# Patient Record
Sex: FEMALE | Race: WHITE | Employment: FULL TIME | URBAN - METROPOLITAN AREA
[De-identification: names, ages, dates, MRNs, and addresses within clinical notes are randomized per-mention and may not be internally consistent; named-entity substitution may affect disease eponyms.]

---

## 2017-04-11 ENCOUNTER — TRANSCRIBE ORDERS (OUTPATIENT)
Dept: ADMINISTRATIVE | Facility: HOSPITAL | Age: 67
End: 2017-04-11

## 2017-04-11 DIAGNOSIS — Z12.31 VISIT FOR SCREENING MAMMOGRAM: Primary | ICD-10-CM

## 2017-05-12 ENCOUNTER — HOSPITAL ENCOUNTER (OUTPATIENT)
Dept: MAMMOGRAPHY | Facility: MEDICAL CENTER | Age: 67
Discharge: HOME | End: 2017-05-12
Payer: COMMERCIAL

## 2017-05-12 DIAGNOSIS — Z12.31 VISIT FOR SCREENING MAMMOGRAM: ICD-10-CM

## 2017-05-12 PROCEDURE — G0202 SCR MAMMO BI INCL CAD: HCPCS

## 2018-08-09 ENCOUNTER — TRANSCRIBE ORDERS (OUTPATIENT)
Dept: ADMINISTRATIVE | Facility: HOSPITAL | Age: 68
End: 2018-08-09

## 2018-08-09 DIAGNOSIS — Z12.31 VISIT FOR SCREENING MAMMOGRAM: Primary | ICD-10-CM

## 2018-08-31 ENCOUNTER — HOSPITAL ENCOUNTER (OUTPATIENT)
Dept: MAMMOGRAPHY | Facility: MEDICAL CENTER | Age: 68
Discharge: HOME | End: 2018-08-31
Payer: COMMERCIAL

## 2018-08-31 DIAGNOSIS — Z12.31 VISIT FOR SCREENING MAMMOGRAM: ICD-10-CM

## 2018-08-31 PROCEDURE — 77067 SCR MAMMO BI INCL CAD: CPT

## 2020-09-24 ENCOUNTER — TRANSCRIBE ORDERS (OUTPATIENT)
Dept: ADMINISTRATIVE | Facility: HOSPITAL | Age: 70
End: 2020-09-24

## 2020-09-24 DIAGNOSIS — Z12.31 ENCOUNTER FOR SCREENING MAMMOGRAM FOR MALIGNANT NEOPLASM OF BREAST: Primary | ICD-10-CM

## 2021-01-08 ENCOUNTER — HOSPITAL ENCOUNTER (OUTPATIENT)
Dept: MAMMOGRAPHY | Facility: MEDICAL CENTER | Age: 71
Discharge: HOME | End: 2021-01-08
Payer: MEDICARE

## 2021-01-08 VITALS — HEIGHT: 65 IN | WEIGHT: 150 LBS | BODY MASS INDEX: 24.99 KG/M2

## 2021-01-08 DIAGNOSIS — Z12.31 ENCOUNTER FOR SCREENING MAMMOGRAM FOR MALIGNANT NEOPLASM OF BREAST: ICD-10-CM

## 2021-01-08 PROCEDURE — 77067 SCR MAMMO BI INCL CAD: CPT

## 2021-01-08 PROCEDURE — 77063 BREAST TOMOSYNTHESIS BI: CPT

## 2021-03-05 ENCOUNTER — EVALUATION (OUTPATIENT)
Dept: PHYSICAL THERAPY | Facility: CLINIC | Age: 71
End: 2021-03-05
Payer: MEDICARE

## 2021-03-05 DIAGNOSIS — M25.512 LEFT SHOULDER PAIN, UNSPECIFIED CHRONICITY: Primary | ICD-10-CM

## 2021-03-05 DIAGNOSIS — M25.511 RIGHT SHOULDER PAIN, UNSPECIFIED CHRONICITY: ICD-10-CM

## 2021-03-05 PROCEDURE — 97161 PT EVAL LOW COMPLEX 20 MIN: CPT | Performed by: PHYSICAL THERAPIST

## 2021-03-05 NOTE — LETTER
2021    Kendall Rutledge PA-C  [address] [place] [id number]    Patient: Shyann Mckeon   YOB: 1950   Date of Visit: 3/5/2021     Encounter Diagnosis     ICD-10-CM    1  Left shoulder pain, unspecified chronicity  M25 512    2  Right shoulder pain, unspecified chronicity  M25 511        Dear Dr Sarika Noyola:    Thank you for your recent referral of Shyann Mckeon  Please review the attached evaluation summary from Lynette's recent visit  Please verify that you agree with the plan of care by signing the attached order  If you have any questions or concerns, please do not hesitate to call  I sincerely appreciate the opportunity to share in the care of one of your patients and hope to have another opportunity to work with you in the near future  Sincerely,    Robinson Bell, PT      Referring Provider:      I certify that I have read the below Plan of Care and certify the need for these services furnished under this plan of treatment while under my care  Kendall Rutledge PA-C  [facility] [address] [id number]  Via Fax: [phone]          PT Evaluation     Today's date: 3/6/2021  Patient name: Shyann Mckeon  : 1950  MRN: [id number]  Referring provider: Meaghan Barr  Dx:   Encounter Diagnosis     ICD-10-CM    1  Left shoulder pain, unspecified chronicity  M25 512    2  Right shoulder pain, unspecified chronicity  M25 511                   Assessment  Assessment details: Shyann Mckeon is a 78y o  year old female presenting to PT with pain, decreased range of motion, decreased strength, and decreased tolerance to activity  Signs and symptoms are consistent with shoulder impingement with contributing forward head/shoulder posture    She has been seeing her chiropractor for neck stiffness, and was recommended to trial PT to address shoulder dysfunction The existing impairments result in difficulty with all overhead shoulder movements as well as repetitive lifting tasks  Melissa Arenas would benefit from skilled PT services to address these issues and to maximize function  Home exercise provided and all questions answered  Thank you for the referral     Impairments: abnormal or restricted ROM, impaired physical strength, lacks appropriate home exercise program, pain with function, scapular dyskinesis and poor posture   Understanding of Dx/Px/POC: good   Prognosis: good    Goals  STG 2-4 weeks  Increase UE strenth 1/2 grade MMT  Decrease pain to <5/10 with activity  Independent with self correction of posture    LTG 6-8 weeks  Demonstrate UE AROM WFL all planes  Decrease pain to <2/10 with activity  Patient independent with comprehensive HEP    Plan  Plan details: Limited to 1 visit/week due to patient's work schedule  Planned therapy interventions: joint mobilization, manual therapy, motor coordination training, neuromuscular re-education, postural training, therapeutic activities, therapeutic exercise, therapeutic training and transfer training  Frequency: 1x week  Duration in weeks: 6        Subjective Evaluation    History of Present Illness  Mechanism of injury: Melissa Arenas reports 3 months of bilateral shoulder pain  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[employer],   Primarily picking and packing orders  She notes that she sleeps on her side and notices increased pain in the left shoulder if she lays on it  She also notices limited rotation in all planes of the left shoulder  Symptoms are similar in the right shoulder but left severe  2-3 weeks ago she fell down 2-3 steps at work resulting in pain in her neck and shoulder, this is being treated by her chiropractor          Pain  Current pain ratin  At best pain rating: 3  At worst pain ratin  Quality: dull ache and sharp  Aggravating factors: lifting and overhead activity  Progression: no change    Treatments  Previous treatment: chiropractic  Current treatment: chiropractic and physical therapy  Patient Goals  Patient goals for therapy: decreased pain, increased motion, increased strength and return to sport/leisure activities          Objective     Tenderness     Left Shoulder   Tenderness in the coracoid process, lateral scapula and supraspinatus tendon  Active Range of Motion   Left Shoulder   Flexion: 140 degrees   Abduction: 120 degrees   External rotation BTH: C5   Internal rotation BTB: L4     Right Shoulder   Flexion: 160 degrees   Abduction: WFL  External rotation BTH: C5   Internal rotation BTB: T10     Strength/Myotome Testing     Left Shoulder     Planes of Motion   Flexion: 4-   Extension: 4-   External rotation at 0°: 4-     Right Shoulder     Planes of Motion   Flexion: 4   Extension: 4   External rotation at 0°: 4-     Tests     Left Shoulder   Positive drop arm, Hawkin's and painful arc  Negative empty can         Flowsheet Rows      Most Recent Value   PT/OT G-Codes   Current Score  59   Projected Score  69             Precautions: NA      Manuals 3/5            1 Rib, clavicle border, pec, serratus, subscap JL            SL scap mobs, ER/IR MWM JL                                      Neuro Re-Ed             UT, levator S bilaterally 4'            TB ER, low trap RTB :5x15                                                                             Ther Ex                                                                                                                     Ther Activity                                       Gait Training                                       Modalities

## 2021-03-05 NOTE — PROGRESS NOTES
PT Evaluation     Today's date: 3/6/2021  Patient name: Maryam Coates  : 1950  MRN: [id number]  Referring provider: Meaghan Martinez  Dx:   Encounter Diagnosis     ICD-10-CM    1  Left shoulder pain, unspecified chronicity  M25 512    2  Right shoulder pain, unspecified chronicity  M25 511                   Assessment  Assessment details: Maryam Coates is a 78y o  year old female presenting to PT with pain, decreased range of motion, decreased strength, and decreased tolerance to activity  Signs and symptoms are consistent with shoulder impingement with contributing forward head/shoulder posture  She has been seeing her chiropractor for neck stiffness, and was recommended to trial PT to address shoulder dysfunction The existing impairments result in difficulty with all overhead shoulder movements as well as repetitive lifting tasks  Agnieszka Hawthorne would benefit from skilled PT services to address these issues and to maximize function  Home exercise provided and all questions answered   Thank you for the referral     Impairments: abnormal or restricted ROM, impaired physical strength, lacks appropriate home exercise program, pain with function, scapular dyskinesis and poor posture   Understanding of Dx/Px/POC: good   Prognosis: good    Goals  STG 2-4 weeks  Increase UE strenth 1/2 grade MMT  Decrease pain to <5/10 with activity  Independent with self correction of posture    LTG 6-8 weeks  Demonstrate UE AROM WFL all planes  Decrease pain to <2/10 with activity  Patient independent with comprehensive HEP    Plan  Plan details: Limited to 1 visit/week due to patient's work schedule  Planned therapy interventions: joint mobilization, manual therapy, motor coordination training, neuromuscular re-education, postural training, therapeutic activities, therapeutic exercise, therapeutic training and transfer training  Frequency: 1x week  Duration in weeks: 6        Subjective Evaluation    History of Present Illness  Mechanism of injury: Genet Kapoor reports 3 months of bilateral shoulder pain  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[employer],   Primarily picking and packing orders  She notes that she sleeps on her side and notices increased pain in the left shoulder if she lays on it  She also notices limited rotation in all planes of the left shoulder  Symptoms are similar in the right shoulder but left severe  2-3 weeks ago she fell down 2-3 steps at work resulting in pain in her neck and shoulder, this is being treated by her chiropractor  Pain  Current pain ratin  At best pain rating: 3  At worst pain ratin  Quality: dull ache and sharp  Aggravating factors: lifting and overhead activity  Progression: no change    Treatments  Previous treatment: chiropractic  Current treatment: chiropractic and physical therapy  Patient Goals  Patient goals for therapy: decreased pain, increased motion, increased strength and return to sport/leisure activities          Objective     Tenderness     Left Shoulder   Tenderness in the coracoid process, lateral scapula and supraspinatus tendon  Active Range of Motion   Left Shoulder   Flexion: 140 degrees   Abduction: 120 degrees   External rotation BTH: C5   Internal rotation BTB: L4     Right Shoulder   Flexion: 160 degrees   Abduction: WFL  External rotation BTH: C5   Internal rotation BTB: T10     Strength/Myotome Testing     Left Shoulder     Planes of Motion   Flexion: 4-   Extension: 4-   External rotation at 0°: 4-     Right Shoulder     Planes of Motion   Flexion: 4   Extension: 4   External rotation at 0°: 4-     Tests     Left Shoulder   Positive drop arm, Hawkin's and painful arc  Negative empty can         Flowsheet Rows      Most Recent Value   PT/OT G-Codes   Current Score  59   Projected Score  69             Precautions: NA      Manuals 3/5            1 Rib, clavicle border, pec, serratus, subscap JL            SL scap mobs, ER/IR MWM JL Neuro Re-Ed             UT, levator S bilaterally 4'            TB ER, low trap RTB :5x15                                                                             Ther Ex                                                                                                                     Ther Activity                                       Gait Training                                       Modalities

## 2021-03-08 ENCOUNTER — TRANSCRIBE ORDERS (OUTPATIENT)
Dept: PHYSICAL THERAPY | Facility: CLINIC | Age: 71
End: 2021-03-08

## 2021-03-08 DIAGNOSIS — M25.511 RIGHT SHOULDER PAIN, UNSPECIFIED CHRONICITY: ICD-10-CM

## 2021-03-08 DIAGNOSIS — M25.512 LEFT SHOULDER PAIN, UNSPECIFIED CHRONICITY: Primary | ICD-10-CM

## 2021-03-12 ENCOUNTER — OFFICE VISIT (OUTPATIENT)
Dept: PHYSICAL THERAPY | Facility: CLINIC | Age: 71
End: 2021-03-12
Payer: MEDICARE

## 2021-03-12 DIAGNOSIS — M25.511 RIGHT SHOULDER PAIN, UNSPECIFIED CHRONICITY: ICD-10-CM

## 2021-03-12 DIAGNOSIS — M25.512 LEFT SHOULDER PAIN, UNSPECIFIED CHRONICITY: Primary | ICD-10-CM

## 2021-03-12 PROCEDURE — 97140 MANUAL THERAPY 1/> REGIONS: CPT

## 2021-03-12 PROCEDURE — 97112 NEUROMUSCULAR REEDUCATION: CPT

## 2021-03-12 NOTE — PROGRESS NOTES
Daily Note     Today's date: 3/12/2021  Patient name: Liya Garner  : 1950  MRN: [id number]  Referring provider: Meaghan Christensen  Dx:   Encounter Diagnosis     ICD-10-CM    1  Left shoulder pain, unspecified chronicity  M25 512    2  Right shoulder pain, unspecified chronicity  M25 511                   Subjective: Gustavo Martinez reports b/l shoulders feel stiff and achy upon arrival  She reports L is more bothersome than R  Sx's tend to get more aggravated following work  Objective: See treatment diary below      Assessment: Gustavo Martinez tolerated PT treatment well  Initiated session with MH to b/l shoulders  STM performed along b/l UT and rhomboid, significant tissue restriction and tightness present throughout  Added rhomboid stretch to address tightness, good tolerance  Shoulder PROM limited in all ranges L>R  Progressed scapular strengthening today, weakness evident accompanied with some L shoulder discomfort that resolved with rest  Pt would benefit from continued PT in effort to further improve shoulder ROM and maximize function with reduced pain/frequency of symptoms  Plan: Continue per plan of care        Precautions: NA      Manuals 3/5 3/12           1 Rib, clavicle border, pec, serratus, subscap JL PROM+STM JW           SL scap mobs, ER/IR MWM JL                                      Neuro Re-Ed             UT, levator S bilaterally 4' 30"x3 ea            TB ER, low trap RTB :5x15 RTB x15           Horizontal ABD  RTB x15           Row  RTB x15           Shoulder extension   RTB x15                                     Ther Ex                                                                                                                     Ther Activity                                       Gait Training                                       Modalities

## 2021-03-19 ENCOUNTER — OFFICE VISIT (OUTPATIENT)
Dept: PHYSICAL THERAPY | Facility: CLINIC | Age: 71
End: 2021-03-19
Payer: MEDICARE

## 2021-03-19 DIAGNOSIS — M25.512 LEFT SHOULDER PAIN, UNSPECIFIED CHRONICITY: Primary | ICD-10-CM

## 2021-03-19 PROCEDURE — 97112 NEUROMUSCULAR REEDUCATION: CPT | Performed by: PHYSICAL THERAPIST

## 2021-03-19 PROCEDURE — 97140 MANUAL THERAPY 1/> REGIONS: CPT | Performed by: PHYSICAL THERAPIST

## 2021-03-19 NOTE — PROGRESS NOTES
Daily Note     Today's date: 3/19/2021  Patient name: Ela Banks  : 1950  MRN: [id number]  Referring provider: Meaghan Huitron  Dx:   Encounter Diagnosis     ICD-10-CM    1  Left shoulder pain, unspecified chronicity  M25 512                   Subjective: Sara Salgado reports significant reduction in RUE symptoms  Her L shoulder will improve for several days, but then returns  Objective: See treatment diary below      Assessment: Tolerated treatment well and updated HEP to include wall clocks for scapular stabilization  Also education on self mobilization techniques for the levator, subscapularis and serratus anterior    Patient demonstrated fatigue post treatment, exhibited good technique with therapeutic exercises and would benefit from continued PT      Plan: Continue per plan of care  Progress treatment as tolerated         Precautions: NA      Manuals 3/5 3/12 3/19          1 Rib, clavicle border, pec, serratus, subscap JL PROM+STM JW JL          SL scap mobs, ER/IR MWM JL  JL          Thoracic HVLA   JL                       Neuro Re-Ed             UT, levator S bilaterally 4' 30"x3 ea            TB ER, low trap RTB :5x15 RTB x15           Horizontal ABD  RTB x15           Row  RTB x15           Shoulder extension   RTB x15           Wall clocks   RTB 5x          Levator, subscap, Serratus MWM   5'          Ther Ex                                                                                                                     Ther Activity                                       Gait Training                                       Modalities

## 2021-03-26 ENCOUNTER — OFFICE VISIT (OUTPATIENT)
Dept: PHYSICAL THERAPY | Facility: CLINIC | Age: 71
End: 2021-03-26
Payer: MEDICARE

## 2021-03-26 DIAGNOSIS — M25.511 RIGHT SHOULDER PAIN, UNSPECIFIED CHRONICITY: ICD-10-CM

## 2021-03-26 DIAGNOSIS — M25.512 LEFT SHOULDER PAIN, UNSPECIFIED CHRONICITY: Primary | ICD-10-CM

## 2021-03-26 PROCEDURE — 97112 NEUROMUSCULAR REEDUCATION: CPT | Performed by: PHYSICAL THERAPIST

## 2021-03-26 PROCEDURE — 97140 MANUAL THERAPY 1/> REGIONS: CPT | Performed by: PHYSICAL THERAPIST

## 2021-03-26 NOTE — PROGRESS NOTES
Daily Note     Today's date: 3/26/2021  Patient name: Silvana Briones  : 1950  MRN: [id number]  Referring provider: Meaghan Olivas  Dx:   Encounter Diagnosis     ICD-10-CM    1  Left shoulder pain, unspecified chronicity  M25 512    2  Right shoulder pain, unspecified chronicity  M25 511                   Subjective: Payton Falcon reports that overall her shoulders continue to improve  Right is nearly pain free while the left comes and goes - but never as severe as prior to PT  Objective: See treatment diary below      Assessment: Tolerated treatment well and continues to progress well towards all goals  Strength and ROM continue to improve, particularly on the left    Patient demonstrated fatigue post treatment and would benefit from continued PT      Plan: Continue per plan of care  Progress treatment as tolerated         Precautions: NA      Manuals 3/5 3/12 3/19 3/26         1 Rib, clavicle border, pec, serratus, subscap JL PROM+STM JW JL JL         SL scap mobs, ER/IR MWM JL  JL JL         Thoracic HVLA   JL                       Neuro Re-Ed             UT, levator S bilaterally 4' 30"x3 ea            TB ER, low trap RTB :5x15 RTB x15  RTB x15         Horizontal ABD  RTB x15 RTB x15 RTB 15x         Row  RTB x15  BTB x15         Shoulder extension   RTB x15  BTB 15x         Wall clocks   RTB 5x RTB 5x ea         Levator, subscap, Serratus MWM   5'          Ther Ex                                                                                                                     Ther Activity                                       Gait Training                                       Modalities

## 2021-04-02 ENCOUNTER — OFFICE VISIT (OUTPATIENT)
Dept: PHYSICAL THERAPY | Facility: CLINIC | Age: 71
End: 2021-04-02
Payer: MEDICARE

## 2021-04-02 DIAGNOSIS — M25.511 RIGHT SHOULDER PAIN, UNSPECIFIED CHRONICITY: ICD-10-CM

## 2021-04-02 DIAGNOSIS — M25.512 LEFT SHOULDER PAIN, UNSPECIFIED CHRONICITY: Primary | ICD-10-CM

## 2021-04-02 PROCEDURE — 97140 MANUAL THERAPY 1/> REGIONS: CPT | Performed by: PHYSICAL THERAPIST

## 2021-04-02 PROCEDURE — 97112 NEUROMUSCULAR REEDUCATION: CPT | Performed by: PHYSICAL THERAPIST

## 2021-04-02 NOTE — PROGRESS NOTES
Daily Note     Today's date: 2021  Patient name: Antonino Bradford  : 1950  MRN: [id number]  Referring provider: Meaghan Briggs  Dx:   Encounter Diagnosis     ICD-10-CM    1  Left shoulder pain, unspecified chronicity  M25 512    2  Right shoulder pain, unspecified chronicity  M25 511                   Subjective: Jenn Simon has determined that her 3-4 digits on left hand experience paresthesias most consistently  Her left shoulder was aggravated when lifting at work this week  She also notices increased pain when sleeping on her side  Objective: See treatment diary below      Assessment: Tolerated treatment well and reviewed sleep positioning to decrease pain in her left shoulder    Patient demonstrated fatigue post treatment and would benefit from continued PT      Plan: Continue per plan of care  Progress treatment as tolerated         Precautions: NA      Manuals 3/5 3/12 3/19 3/26 4/2        1 Rib, clavicle border, pec, serratus, subscap JL PROM+STM JW JL JL JL        SL scap mobs, ER/IR MWM JL  JL JL JL        Thoracic HVLA   JL                       Neuro Re-Ed             UT, levator S bilaterally 4' 30"x3 ea            TB ER, low trap RTB :5x15 RTB x15  RTB x15         Horizontal ABD  RTB x15 RTB x15 RTB 15x         Row  RTB x15  BTB x15         Shoulder extension   RTB x15  BTB 15x         Wall clocks   RTB 5x RTB 5x ea         Levator, subscap, Serratus MWM   5'          Education      sleeping posture, HEP 15'        Ther Ex                                                                                                                     Ther Activity                                       Gait Training                                       Modalities

## 2021-04-09 ENCOUNTER — OFFICE VISIT (OUTPATIENT)
Dept: PHYSICAL THERAPY | Facility: CLINIC | Age: 71
End: 2021-04-09
Payer: MEDICARE

## 2021-04-09 DIAGNOSIS — M25.512 LEFT SHOULDER PAIN, UNSPECIFIED CHRONICITY: Primary | ICD-10-CM

## 2021-04-09 PROCEDURE — 97140 MANUAL THERAPY 1/> REGIONS: CPT

## 2021-04-09 PROCEDURE — 97110 THERAPEUTIC EXERCISES: CPT

## 2021-04-09 NOTE — PROGRESS NOTES
Daily Note     Today's date: 2021  Patient name: Poornima Bell  : 1950  MRN: [id number]  Referring provider: Meaghan Villalta  Dx:   Encounter Diagnosis     ICD-10-CM    1  Left shoulder pain, unspecified chronicity  M25 512        Start Time: 1032  Stop Time: 1117  Total time in clinic (min): 45 minutes    Subjective: Pt continues to note B shoulder pain (L>R)  She experiences relief following PT sessions and with stretches at home but pain/sx return within 1-5 hours following treatment  2-3/10 pain at start of session  Objective: See treatment diary below      Assessment: Tolerated treatment well  Able to modulate pain with MHP and STM at start of session  Pt reports greatest muscle fatigue with "wall clock" exercise  Patient demonstrated fatigue post treatment, exhibited good technique with therapeutic exercises and would benefit from continued PT      Plan: Continue per plan of care        Precautions: NA      Manuals 3/5 3/12 3/19 3/26 4/2 4/9       1 Rib, clavicle border, pec, serratus, subscap JL PROM+STM JW JL JL JL VR       SL scap mobs, ER/IR MWM JL  JL JL JL VR       Thoracic HVLA   JL                       Neuro Re-Ed             UT, levator S bilaterally 4' 30"x3 ea     home       TB ER, low trap RTB :5x15 RTB x15  RTB x15  RTB  x15       Horizontal ABD  RTB x15 RTB x15 RTB 15x  RTB  x15       Row  RTB x15  BTB x15  BTB  x20       Shoulder extension   RTB x15  BTB 15x  BTB  x15       Wall clocks   RTB 5x RTB 5x ea         Levator, subscap, Serratus MWM   5'          Education      sleeping posture, HEP 15' reviewed sleeping posture and HEP        Ther Ex                                                                                                                     Ther Activity                                       Gait Training                                       Modalities

## 2021-04-16 ENCOUNTER — OFFICE VISIT (OUTPATIENT)
Dept: PHYSICAL THERAPY | Facility: CLINIC | Age: 71
End: 2021-04-16
Payer: MEDICARE

## 2021-04-16 DIAGNOSIS — M25.512 LEFT SHOULDER PAIN, UNSPECIFIED CHRONICITY: Primary | ICD-10-CM

## 2021-04-16 PROCEDURE — 97140 MANUAL THERAPY 1/> REGIONS: CPT | Performed by: PHYSICAL THERAPIST

## 2021-04-16 NOTE — PROGRESS NOTES
Daily Note     Today's date: 2021  Patient name: Joleen Friday  : 1950  MRN: [id number]  Referring provider: Meaghan Mckeon  Dx:   Encounter Diagnosis     ICD-10-CM    1  Left shoulder pain, unspecified chronicity  M25 512                   Subjective: Derik Cons reports continues soreness and limited IR rotation behind her back  Objective: See treatment diary below      Assessment: Tolerated treatment well and shows increased active mobility following manual therapy today, she also has decreased pain at end range  MT included T1 shear, sub scap and medial scap border MWM   Patient would benefit from continued PT      Plan: Continue per plan of care  Progress treatment as tolerated         Precautions: NA      Manuals 3/5 3/12 3/19 3/26 4/2 4/9 4/16      1 Rib, clavicle border, pec, serratus, subscap JL PROM+STM JW JL JL JL VR JL      SL scap mobs, ER/IR MWM JL  JL JL JL VR JL      Thoracic HVLA   JL          T1 shear MWM       JL      Neuro Re-Ed             UT, levator S bilaterally 4' 30"x3 ea     home       TB ER, low trap RTB :5x15 RTB x15  RTB x15  RTB  x15 RTB x15      Horizontal ABD  RTB x15 RTB x15 RTB 15x  RTB  x15       Row  RTB x15  BTB x15  BTB  x20       Shoulder extension   RTB x15  BTB 15x  BTB  x15       Wall clocks   RTB 5x RTB 5x ea         Levator, subscap, Serratus MWM   5'          Education      sleeping posture, HEP 15' reviewed sleeping posture and HEP        Ther Ex                                                                                                                     Ther Activity                                       Gait Training                                       Modalities

## 2021-04-23 ENCOUNTER — OFFICE VISIT (OUTPATIENT)
Dept: PHYSICAL THERAPY | Facility: CLINIC | Age: 71
End: 2021-04-23
Payer: MEDICARE

## 2021-04-23 DIAGNOSIS — M25.512 LEFT SHOULDER PAIN, UNSPECIFIED CHRONICITY: Primary | ICD-10-CM

## 2021-04-23 DIAGNOSIS — M25.511 RIGHT SHOULDER PAIN, UNSPECIFIED CHRONICITY: ICD-10-CM

## 2021-04-23 PROCEDURE — 97140 MANUAL THERAPY 1/> REGIONS: CPT | Performed by: PHYSICAL THERAPIST

## 2021-04-23 NOTE — PROGRESS NOTES
Daily Note     Today's date: 2021  Patient name: Dayanna Garcia  : 1950  MRN: [id number]  Referring provider: Meaghan Schmid  Dx:   Encounter Diagnosis     ICD-10-CM    1  Left shoulder pain, unspecified chronicity  M25 512    2  Right shoulder pain, unspecified chronicity  M25 511                   Subjective: Shanon Anne notices improving ROM out to the side and behind her back  She had much less pain for about 3 days after last visit  Objective: See treatment diary below      Assessment: Tolerated treatment well and continues to benefit from manual therapy to compliment HEP  Reviewed sleeper stretch to continue to address IR deficits at home    Patient exhibited good technique with therapeutic exercises and would benefit from continued PT      Plan: Continue per plan of care  Progress treatment as tolerated         Precautions: NA      Manuals 3/5 3/12 3/19 3/26 4/2 4/9 4/16 4/23     1 Rib, clavicle border, pec, serratus, subscap JL PROM+STM JW JL JL JL VR JL JL     SL scap mobs, ER/IR MWM JL  JL JL JL VR JL JL     Thoracic HVLA   JL          T1 shear MWM       JL JL     Neuro Re-Ed             UT, levator S bilaterally 4' 30"x3 ea     home       TB ER, low trap RTB :5x15 RTB x15  RTB x15  RTB  x15 RTB x15      Horizontal ABD  RTB x15 RTB x15 RTB 15x  RTB  x15       Row  RTB x15  BTB x15  BTB  x20       Shoulder extension   RTB x15  BTB 15x  BTB  x15       Wall clocks   RTB 5x RTB 5x ea         Levator, subscap, Serratus MWM   5'          Education      sleeping posture, HEP 15' reviewed sleeping posture and HEP   HEP reivew     Ther Ex                                                                                                                     Ther Activity                                       Gait Training                                       Modalities

## 2021-04-30 ENCOUNTER — OFFICE VISIT (OUTPATIENT)
Dept: PHYSICAL THERAPY | Facility: CLINIC | Age: 71
End: 2021-04-30
Payer: MEDICARE

## 2021-04-30 DIAGNOSIS — M25.512 LEFT SHOULDER PAIN, UNSPECIFIED CHRONICITY: Primary | ICD-10-CM

## 2021-04-30 DIAGNOSIS — M25.511 RIGHT SHOULDER PAIN, UNSPECIFIED CHRONICITY: ICD-10-CM

## 2021-04-30 PROCEDURE — 97140 MANUAL THERAPY 1/> REGIONS: CPT | Performed by: PHYSICAL THERAPIST

## 2021-04-30 NOTE — PROGRESS NOTES
Daily Note     Today's date: 2021  Patient name: Dayanna Garcia  : 1950  MRN: [id number]  Referring provider: Meaghan Schmid  Dx:   Encounter Diagnosis     ICD-10-CM    1  Left shoulder pain, unspecified chronicity  M25 512    2  Right shoulder pain, unspecified chronicity  M25 511                   Subjective: Shanon Anne notes improvement in overall shoulder mobility  Objective: See treatment diary below      Assessment: Tolerated treatment well and continued focus on MT today  Included nerve tensioning to LUE today with some relief of deep ache in her shoulder    Patient exhibited good technique with therapeutic exercises and would benefit from continued PT      Plan: Continue per plan of care  Progress treatment as tolerated         Precautions: NA      Manuals 3/5 3/12 3/19 3/26 4/2 4/9 4/16 4/23 4/30    1 Rib, clavicle border, pec, serratus, subscap JL PROM+STM JW JL JL JL VR JL JL JL    SL scap mobs, ER/IR MWM JL  JL JL JL VR JL JL JL    Thoracic HVLA   JL          T1 shear MWM       JL JL JL    LUE nerve tensioning, median bias         JL    Neuro Re-Ed             UT, levator S bilaterally 4' 30"x3 ea     home       TB ER, low trap RTB :5x15 RTB x15  RTB x15  RTB  x15 RTB x15      Horizontal ABD  RTB x15 RTB x15 RTB 15x  RTB  x15       Row  RTB x15  BTB x15  BTB  x20       Shoulder extension   RTB x15  BTB 15x  BTB  x15       Wall clocks   RTB 5x RTB 5x ea         Levator, subscap, Serratus MWM   5'          Education      sleeping posture, HEP 15' reviewed sleeping posture and HEP   HEP reivew     Ther Ex                                                                                                                     Ther Activity                                       Gait Training                                       Modalities

## 2021-05-07 ENCOUNTER — OFFICE VISIT (OUTPATIENT)
Dept: PHYSICAL THERAPY | Facility: CLINIC | Age: 71
End: 2021-05-07
Payer: MEDICARE

## 2021-05-07 DIAGNOSIS — M25.511 RIGHT SHOULDER PAIN, UNSPECIFIED CHRONICITY: ICD-10-CM

## 2021-05-07 DIAGNOSIS — M25.512 LEFT SHOULDER PAIN, UNSPECIFIED CHRONICITY: Primary | ICD-10-CM

## 2021-05-07 PROCEDURE — 97110 THERAPEUTIC EXERCISES: CPT | Performed by: PHYSICAL THERAPIST

## 2021-05-07 PROCEDURE — 97140 MANUAL THERAPY 1/> REGIONS: CPT | Performed by: PHYSICAL THERAPIST

## 2021-05-07 NOTE — PROGRESS NOTES
Daily Note     Today's date: 2021  Patient name: Stacey Villalobos  : 1950  MRN: [id number]  Referring provider: Meaghan Boo  Dx:   Encounter Diagnosis     ICD-10-CM    1  Left shoulder pain, unspecified chronicity  M25 512    2  Right shoulder pain, unspecified chronicity  M25 511                   Subjective: Pt reports that she continues with some shoulder irritation with reaching behind her back and overhead  She feels the manual work has been helping  Objective: See treatment diary below  L cervical SB and rotation restricted and painful--> improved post mobilizations  L shoulder flexion pain limited at 140*--> similar post manuals but less pain      Assessment: Tolerated treatment well  Patient would benefit from continued PT  Pt with improvements in overhead lifting during cervical retraction and after repeated retractions  Educated on following up manuals with this stretching for home  Plan: Continue per plan of care        Precautions: NA      Manuals 3/5 3/12 3/19 3/26 4/2 4/9 4/16 4/23 4/30 5/7   1 Rib, clavicle border, pec, serratus, subscap JL PROM+STM JW JL JL JL VR JL JL JL CB   SL scap mobs, ER/IR MWM JL  JL JL JL VR JL JL JL CB   Thoracic HVLA   JL          T1 shear MWM       JL JL JL CB   LUE nerve tensioning, median bias         JL    Neuro Re-Ed             UT, levator S bilaterally 4' 30"x3 ea     home       TB ER, low trap RTB :5x15 RTB x15  RTB x15  RTB  x15 RTB x15      Horizontal ABD  RTB x15 RTB x15 RTB 15x  RTB  x15       Row  RTB x15  BTB x15  BTB  x20       Shoulder extension   RTB x15  BTB 15x  BTB  x15       Wall clocks   RTB 5x RTB 5x ea         Levator, subscap, Serratus MWM   5'          Repeated retractio             Education      sleeping posture, HEP 15' reviewed sleeping posture and HEP   HEP reivew     Ther Ex                                                                                                                     Ther Activity Gait Training                                       Modalities

## 2021-05-14 ENCOUNTER — OFFICE VISIT (OUTPATIENT)
Dept: PHYSICAL THERAPY | Facility: CLINIC | Age: 71
End: 2021-05-14
Payer: MEDICARE

## 2021-05-14 DIAGNOSIS — M25.511 RIGHT SHOULDER PAIN, UNSPECIFIED CHRONICITY: ICD-10-CM

## 2021-05-14 DIAGNOSIS — M25.512 LEFT SHOULDER PAIN, UNSPECIFIED CHRONICITY: Primary | ICD-10-CM

## 2021-05-14 PROCEDURE — 97110 THERAPEUTIC EXERCISES: CPT | Performed by: PHYSICAL THERAPIST

## 2021-05-14 PROCEDURE — 97140 MANUAL THERAPY 1/> REGIONS: CPT | Performed by: PHYSICAL THERAPIST

## 2021-05-21 ENCOUNTER — OFFICE VISIT (OUTPATIENT)
Dept: PHYSICAL THERAPY | Facility: CLINIC | Age: 71
End: 2021-05-21
Payer: MEDICARE

## 2021-05-21 DIAGNOSIS — M25.511 RIGHT SHOULDER PAIN, UNSPECIFIED CHRONICITY: ICD-10-CM

## 2021-05-21 DIAGNOSIS — M25.512 LEFT SHOULDER PAIN, UNSPECIFIED CHRONICITY: Primary | ICD-10-CM

## 2021-05-21 PROCEDURE — 97140 MANUAL THERAPY 1/> REGIONS: CPT | Performed by: PHYSICAL THERAPIST

## 2021-05-21 NOTE — PROGRESS NOTES
Daily Note     Today's date: 2021  Patient name: Refugio Tate  : 1950  MRN: [id number]  Referring provider: Meaghan Max  Dx:   Encounter Diagnosis     ICD-10-CM    1  Left shoulder pain, unspecified chronicity  M25 512    2  Right shoulder pain, unspecified chronicity  M25 511                   Subjective: Elvis Kim notes continued improvement in her left shoulder AROM and pain levels  Objective: See treatment diary below      Assessment: Tolerated treatment well and demonstrates increased abduction AROM follow AC mobs and scapular border release  Patient exhibited good technique with therapeutic exercises and would benefit from continued PT      Plan: Continue per plan of care  Progress treatment as tolerated  Precautions: NA      Manuals    1 Rib, clavicle border, pec, serratus, subscap CB  15     JL JL JL CB   SL scap mobs, ER/IR MWM CB 15     JL JL JL CB   Thoracic HVLA             T1 shear MWM CB 5     JL JL JL CB   LUE nerve tensioning, median bias  5       JL    Neuro Re-Ed             UT, levator S bilaterally             TB ER, low trap       RTB x15      Horizontal ABD             Row             Shoulder extension              Wall clocks             Levator, subscap, Serratus MWM             Repeated retractio             Education         HEP reivew     Ther Ex             Table slides flexion 15x10:                                                                                                        Ther Activity                                       Gait Training                                       Modalities

## 2021-05-28 ENCOUNTER — OFFICE VISIT (OUTPATIENT)
Dept: PHYSICAL THERAPY | Facility: CLINIC | Age: 71
End: 2021-05-28
Payer: MEDICARE

## 2021-05-28 DIAGNOSIS — M25.512 LEFT SHOULDER PAIN, UNSPECIFIED CHRONICITY: Primary | ICD-10-CM

## 2021-05-28 PROCEDURE — 97140 MANUAL THERAPY 1/> REGIONS: CPT | Performed by: PHYSICAL THERAPIST

## 2021-05-28 NOTE — PROGRESS NOTES
Daily Note     Today's date: 2021  Patient name: Pam Banegas  : 1950  MRN: [id number]  Referring provider: Meaghan Moran  Dx:   Encounter Diagnosis     ICD-10-CM    1  Left shoulder pain, unspecified chronicity  M25 512                   Subjective: Ratna Charles notes that the left shoulder is more sore today than it has been  She is not ready to seek intervention beyond PT  Objective: See treatment diary below      Assessment: Tolerated treatment fair and Continued focus on MT with decrease in pain and increased mobility by end of sesion  Educated patient on potential benefits of injection therapy if her MD were to find this an appropriate addition to her current conservative care    Patient would benefit from continued PT      Plan: Continue per plan of care  Progress treatment as tolerated  Precautions: NA      Manuals  5   1 Rib, clavicle border, pec, serratus, subscap CB  15 15    JL JL JL CB   SL scap mobs, ER/IR MWM CB 15 15      JL JL JL CB   Thoracic HVLA             T1 shear MWM CB 5 5    JL JL JL CB   LUE nerve tensioning, median bias  5 5      JL    Neuro Re-Ed             UT, levator S bilaterally             TB ER, low trap       RTB x15      Horizontal ABD             Row             Shoulder extension              Wall clocks             Levator, subscap, Serratus MWM             Repeated retractio             Education         HEP reivew     Ther Ex             Table slides flexion 15x10:                                                                                                        Ther Activity                                       Gait Training                                       Modalities

## 2021-06-04 ENCOUNTER — OFFICE VISIT (OUTPATIENT)
Dept: PHYSICAL THERAPY | Facility: CLINIC | Age: 71
End: 2021-06-04
Payer: MEDICARE

## 2021-06-04 DIAGNOSIS — M25.512 LEFT SHOULDER PAIN, UNSPECIFIED CHRONICITY: Primary | ICD-10-CM

## 2021-06-04 PROCEDURE — 97140 MANUAL THERAPY 1/> REGIONS: CPT | Performed by: PHYSICAL THERAPIST

## 2021-06-04 NOTE — PROGRESS NOTES
Daily Note     Today's date: 2021  Patient name: Poornima Bell  : 1950  MRN: [id number]  Referring provider: Meaghan Villalta  Dx:   Encounter Diagnosis     ICD-10-CM    1  Left shoulder pain, unspecified chronicity  M25 512                   Subjective: Sandhya Munoz is having higher grade pain, consistent with last week  She is still hesitant to pursue injection or additional imaging  Objective: See treatment diary below      Assessment: Tolerated treatment fair and has improved mobility but minimal reduction in pain today    Patient would benefit from continued PT      Plan: Continue per plan of care  Precautions: NA      Manuals  5   1 Rib, clavicle border, pec, serratus, subscap CB  15 15 15   JL JL JL CB   SL scap mobs, ER/IR MWM CB 15 15   15   JL JL JL CB   Thoracic HVLA    5         T1 shear MWM CB 5 5 5   JL JL JL CB   LUE nerve tensioning, median bias  5 5      JL    Neuro Re-Ed             UT, levator S bilaterally             TB ER, low trap       RTB x15      Horizontal ABD             Row             Shoulder extension              Wall clocks             Levator, subscap, Serratus MWM             Repeated retractio             Education         HEP reivew     Ther Ex             Table slides flexion 15x10:                                                                                                        Ther Activity                                       Gait Training                                       Modalities

## 2021-06-11 ENCOUNTER — OFFICE VISIT (OUTPATIENT)
Dept: PHYSICAL THERAPY | Facility: CLINIC | Age: 71
End: 2021-06-11
Payer: MEDICARE

## 2021-06-11 DIAGNOSIS — M25.512 LEFT SHOULDER PAIN, UNSPECIFIED CHRONICITY: Primary | ICD-10-CM

## 2021-06-11 PROCEDURE — 97112 NEUROMUSCULAR REEDUCATION: CPT | Performed by: PHYSICAL THERAPIST

## 2021-06-11 PROCEDURE — 97140 MANUAL THERAPY 1/> REGIONS: CPT | Performed by: PHYSICAL THERAPIST

## 2021-06-11 NOTE — PROGRESS NOTES
Daily Note     Today's date: 2021  Patient name: Poornima Bell  : 1950  MRN: [id number]  Referring provider: Meaghan Villalta  Dx:   Encounter Diagnosis     ICD-10-CM    1  Left shoulder pain, unspecified chronicity  M25 512                   Subjective: Sandhya Munoz reports her left shoulder pain is intermittent, sometimes lateral and sometimes posterior  Objective: See treatment diary below      Assessment: Tolerated treatment well and she notes decreased pain and increased ROM by end of session today  Improved IR following medial scap STM and scap mobility  HEP updated to include D2 PNF after responding to MRE today  Patient demonstrated fatigue post treatment, exhibited good technique with therapeutic exercises and would benefit from continued PT      Plan: Continue per plan of care  Progress treatment as tolerated  Precautions: NA      Manuals  5   1 Rib, clavicle border, pec, serratus, subscap CB  15 15 15 15  JL JL JL CB   SL scap mobs, ER/IR MWM CB 15 15   15 15  JL JL JL CB   Thoracic HVLA    5         T1 shear MWM CB 5 5 5   JL JL JL CB   LUE nerve tensioning, median bias  5 5      JL    Neuro Re-Ed             HEP review      10'       PNF D2      15x       TB ER, low trap       RTB x15      Horizontal ABD             Row             Shoulder extension              Wall clocks             Levator, subscap, Serratus MWM             Repeated retractio             Education         HEP reivew     Ther Ex             Table slides flexion 15x10:                                                                                                        Ther Activity                                       Gait Training                                       Modalities

## 2021-06-18 ENCOUNTER — OFFICE VISIT (OUTPATIENT)
Dept: PHYSICAL THERAPY | Facility: CLINIC | Age: 71
End: 2021-06-18
Payer: MEDICARE

## 2021-06-18 DIAGNOSIS — M25.512 LEFT SHOULDER PAIN, UNSPECIFIED CHRONICITY: Primary | ICD-10-CM

## 2021-06-18 DIAGNOSIS — M25.511 RIGHT SHOULDER PAIN, UNSPECIFIED CHRONICITY: ICD-10-CM

## 2021-06-18 PROCEDURE — 97112 NEUROMUSCULAR REEDUCATION: CPT | Performed by: PHYSICAL THERAPIST

## 2021-06-18 PROCEDURE — 97140 MANUAL THERAPY 1/> REGIONS: CPT | Performed by: PHYSICAL THERAPIST

## 2021-06-18 NOTE — PROGRESS NOTES
PT Discharge    Today's date: 2021  Patient name: Refugio Tate  : 1950  MRN: [id number]  Referring provider: Meaghan Max  Dx:   Encounter Diagnosis     ICD-10-CM    1  Left shoulder pain, unspecified chronicity  M25 512    2  Right shoulder pain, unspecified chronicity  M25 511                   Assessment  Assessment details: Elvis Kim has been compliant with attending PT and home exercise program since initial eval and reports >50% improvement since start of care  Elvis Kim reports and demonstrates decreased pain, increased strength, improved joint mobility and improved postural awareness since initial eval but is still limited compared to prior level of function  Elvis Kim continues to have soreness in the left shoulder with prolonged exertion  She is currently restricted from additional overtime by her PCP and would benefit from continued limitations  Impairments: abnormal or restricted ROM, impaired physical strength, lacks appropriate home exercise program, pain with function, scapular dyskinesis and poor posture   Understanding of Dx/Px/POC: good   Prognosis: good    Goals  STG 2-4 weeks  Increase UE strenth 1/2 grade MMT - met  Decrease pain to <5/10 with activity - met  Independent with self correction of posture - met    LTG 6-8 weeks  Demonstrate UE AROM WFL all planes - progressing  Decrease pain to <2/10 with activity - progressing  Patient independent with comprehensive HEP - met    Plan  Planned therapy interventions: joint mobilization, manual therapy, motor coordination training, neuromuscular re-education, postural training, therapeutic activities, therapeutic exercise, therapeutic training and transfer training        Subjective Evaluation    History of Present Illness  Mechanism of injury: Elvis Kim reports 3 months of bilateral shoulder pain  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[employer],   Primarily picking and packing orders       She notes that she sleeps on her side and notices increased pain in the left shoulder if she lays on it  She also notices limited rotation in all planes of the left shoulder  Symptoms are similar in the right shoulder but left severe  2-3 weeks ago she fell down 2-3 steps at work resulting in pain in her neck and shoulder, this is being treated by her chiropractor  Pain  Current pain ratin  At best pain rating: 3  At worst pain ratin  Quality: dull ache and sharp  Aggravating factors: lifting and overhead activity  Progression: no change    Treatments  Previous treatment: chiropractic  Current treatment: chiropractic and physical therapy  Patient Goals  Patient goals for therapy: decreased pain, increased motion, increased strength and return to sport/leisure activities          Objective     Tenderness     Left Shoulder   Tenderness in the coracoid process, lateral scapula and supraspinatus tendon  Active Range of Motion   Left Shoulder   Flexion: 140 degrees   Abduction: 120 degrees   External rotation BTH: C5   Internal rotation BTB: L4     Right Shoulder   Flexion: 160 degrees   Abduction: WFL  External rotation BTH: C5   Internal rotation BTB: T10     Strength/Myotome Testing     Left Shoulder     Planes of Motion   Flexion: 4-   Extension: 4-   External rotation at 0°: 4-     Right Shoulder     Planes of Motion   Flexion: 4   Extension: 4   External rotation at 0°: 4-     Tests     Left Shoulder   Positive drop arm, Hawkin's and painful arc  Negative empty can                Precautions: NA      Manuals  6       1 Rib, clavicle border, pec, serratus, subscap CB  15 15 15 15 15       SL scap mobs, ER/IR MWM CB 15 15   15 15 15         Thoracic HVLA    5         T1 shear MWM CB 5 5 5         LUE nerve tensioning, median bias  5 5          Neuro Re-Ed             HEP review      JL       PNF D2             TB ER, low trap             Horizontal ABD             Row             Shoulder extension Wall clocks             Levator, subscap, Serratus MWM             Repeated retractio             Education              Ther Ex             Table slides flexion 15x10:                                                                                                        Ther Activity                                       Gait Training                                       Modalities

## 2021-06-18 NOTE — LETTER
2021    Alexis Keita PA-C  [address] [place] [id number]    Patient: Antonino Bradford   YOB: 1950   Date of Visit: 2021     Encounter Diagnosis     ICD-10-CM    1  Left shoulder pain, unspecified chronicity  M25 512    2  Right shoulder pain, unspecified chronicity  M25 511        Dear Dr Carl Larry: Thank you for your recent referral of Antonino Bradford  Please review the attached evaluation summary from Lynette's recent visit  Please verify that you agree with the plan of care by signing the attached order  If you have any questions or concerns, please do not hesitate to call  I sincerely appreciate the opportunity to share in the care of one of your patients and hope to have another opportunity to work with you in the near future  Sincerely,    Deedee Elder, PT      Referring Provider:      I certify that I have read the below Plan of Care and certify the need for these services furnished under this plan of treatment while under my care  Alexis Keita PA-C  [place] [id number] [phone]  Via Fax: [phone]          PT Discharge    Today's date: 2021  Patient name: Antonino Bradford  : 1950  MRN: [id number]  Referring provider: Meaghan Briggs  Dx:   Encounter Diagnosis     ICD-10-CM    1  Left shoulder pain, unspecified chronicity  M25 512    2  Right shoulder pain, unspecified chronicity  M25 511                   Assessment  Assessment details: Jenn Simon has been compliant with attending PT and home exercise program since initial eval and reports >50% improvement since start of care  Jenn Simon reports and demonstrates decreased pain, increased strength, improved joint mobility and improved postural awareness since initial eval but is still limited compared to prior level of function  Jenn Simon continues to have soreness in the left shoulder with prolonged exertion   She is currently restricted from additional overtime by her PCP and would benefit from continued limitations  Impairments: abnormal or restricted ROM, impaired physical strength, lacks appropriate home exercise program, pain with function, scapular dyskinesis and poor posture   Understanding of Dx/Px/POC: good   Prognosis: good    Goals  STG 2-4 weeks  Increase UE strenth 1/2 grade MMT - met  Decrease pain to <5/10 with activity - met  Independent with self correction of posture - met    LTG 6-8 weeks  Demonstrate UE AROM WFL all planes - progressing  Decrease pain to <2/10 with activity - progressing  Patient independent with comprehensive HEP - met    Plan  Planned therapy interventions: joint mobilization, manual therapy, motor coordination training, neuromuscular re-education, postural training, therapeutic activities, therapeutic exercise, therapeutic training and transfer training        Subjective Evaluation    History of Present Illness  Mechanism of injury: Derik Marie reports 3 months of bilateral shoulder pain  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[employer],   Primarily picking and packing orders  She notes that she sleeps on her side and notices increased pain in the left shoulder if she lays on it  She also notices limited rotation in all planes of the left shoulder  Symptoms are similar in the right shoulder but left severe  2-3 weeks ago she fell down 2-3 steps at work resulting in pain in her neck and shoulder, this is being treated by her chiropractor          Pain  Current pain ratin  At best pain rating: 3  At worst pain ratin  Quality: dull ache and sharp  Aggravating factors: lifting and overhead activity  Progression: no change    Treatments  Previous treatment: chiropractic  Current treatment: chiropractic and physical therapy  Patient Goals  Patient goals for therapy: decreased pain, increased motion, increased strength and return to sport/leisure activities          Objective Tenderness     Left Shoulder   Tenderness in the coracoid process, lateral scapula and supraspinatus tendon  Active Range of Motion   Left Shoulder   Flexion: 140 degrees   Abduction: 120 degrees   External rotation BTH: C5   Internal rotation BTB: L4     Right Shoulder   Flexion: 160 degrees   Abduction: WFL  External rotation BTH: C5   Internal rotation BTB: T10     Strength/Myotome Testing     Left Shoulder     Planes of Motion   Flexion: 4-   Extension: 4-   External rotation at 0°: 4-     Right Shoulder     Planes of Motion   Flexion: 4   Extension: 4   External rotation at 0°: 4-     Tests     Left Shoulder   Positive drop arm, Hawkin's and painful arc  Negative empty can  Precautions: NA      Manuals 5/14 5/21 5/28 6/4 6/11 6/18       1 Rib, clavicle border, pec, serratus, subscap CB  15 15 15 15 15       SL scap mobs, ER/IR MWM CB 15 15   15 15 15         Thoracic HVLA    5         T1 shear MWM CB 5 5 5         LUE nerve tensioning, median bias  5 5          Neuro Re-Ed             HEP review      JL       PNF D2             TB ER, low trap             Horizontal ABD             Row             Shoulder extension              Wall clocks             Levator, subscap, Serratus MWM             Repeated retractio             Education              Ther Ex             Table slides flexion 15x10:                                                                                                        Ther Activity                                       Gait Training                                       Modalities

## 2021-06-25 ENCOUNTER — APPOINTMENT (OUTPATIENT)
Dept: PHYSICAL THERAPY | Facility: CLINIC | Age: 71
End: 2021-06-25
Payer: MEDICARE